# Patient Record
Sex: FEMALE | Race: WHITE
[De-identification: names, ages, dates, MRNs, and addresses within clinical notes are randomized per-mention and may not be internally consistent; named-entity substitution may affect disease eponyms.]

---

## 2020-01-15 ENCOUNTER — HOSPITAL ENCOUNTER (OUTPATIENT)
Dept: HOSPITAL 53 - M WUC | Age: 25
End: 2020-01-15
Attending: PHYSICIAN ASSISTANT
Payer: COMMERCIAL
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
## 2020-01-15 DIAGNOSIS — S60.212A: Primary | ICD-10-CM

## 2020-01-15 NOTE — REP
Left wrist series:  Four views.

 

History:  Contusion.

 

Findings:  Four views of the left wrist demonstrate overall normal

mineralization.  No fracture or subluxation is seen.

 

Impression:

 

Negative radiographs of the left wrist.

 

 

Electronically Signed by

Pierre Begum MD 01/15/2020 07:37 P

## 2020-01-16 NOTE — REP
Left hand series:  Four views:

 

History:  Contusion.

 

Findings:  Four views of the left hand demonstrate overall normal mineralization.

Bones joints and soft tissues are unremarkable.  No fracture or subluxation is

seen.

 

Impression:

 

Negative left hand radiographs.

 

 

Electronically Signed by

Pierre Begum MD 01/16/2020 09:33 A

## 2020-08-05 ENCOUNTER — HOSPITAL ENCOUNTER (OUTPATIENT)
Dept: HOSPITAL 53 - M RAD | Age: 25
End: 2020-08-05
Attending: PHYSICIAN ASSISTANT
Payer: COMMERCIAL

## 2020-08-05 ENCOUNTER — HOSPITAL ENCOUNTER (OUTPATIENT)
Dept: HOSPITAL 53 - M LAB REF | Age: 25
End: 2020-08-05
Attending: PHYSICIAN ASSISTANT
Payer: COMMERCIAL

## 2020-08-05 DIAGNOSIS — R10.9: ICD-10-CM

## 2020-08-05 DIAGNOSIS — Z90.49: ICD-10-CM

## 2020-08-05 DIAGNOSIS — N28.1: ICD-10-CM

## 2020-08-05 DIAGNOSIS — K76.0: Primary | ICD-10-CM

## 2020-08-05 DIAGNOSIS — R10.11: Primary | ICD-10-CM

## 2020-09-05 LAB
BASOPHILS # BLD AUTO: 0.1 10^3/UL (ref 0–0.2)
BASOPHILS NFR BLD AUTO: 0.6 % (ref 0–1)
EOSINOPHIL # BLD AUTO: 0.1 10^3/UL (ref 0–0.5)
EOSINOPHIL NFR BLD AUTO: 1 % (ref 0–3)
HCT VFR BLD AUTO: 42 % (ref 36–47)
HGB BLD-MCNC: 14.1 G/DL (ref 12–15.5)
LYMPHOCYTES # BLD AUTO: 2.6 10^3/UL (ref 1.5–5)
LYMPHOCYTES NFR BLD AUTO: 32.5 % (ref 24–44)
MCH RBC QN AUTO: 30.9 PG (ref 27–33)
MCHC RBC AUTO-ENTMCNC: 33.6 G/DL (ref 32–36.5)
MCV RBC AUTO: 91.9 FL (ref 80–96)
MONOCYTES # BLD AUTO: 0.6 10^3/UL (ref 0–0.8)
MONOCYTES NFR BLD AUTO: 7.3 % (ref 0–5)
NEUTROPHILS # BLD AUTO: 4.7 10^3/UL (ref 1.5–8.5)
NEUTROPHILS NFR BLD AUTO: 58.2 % (ref 36–66)
PLATELET # BLD AUTO: 285 10^3/UL (ref 150–450)
RBC # BLD AUTO: 4.57 10^6/UL (ref 4–5.4)
WBC # BLD AUTO: 8 10^3/UL (ref 4–10)

## 2020-10-02 LAB
ALBUMIN SERPL BCG-MCNC: 4 GM/DL (ref 3.2–5.2)
ALT SERPL W P-5'-P-CCNC: 60 U/L (ref 12–78)
BILIRUB SERPL-MCNC: 0.5 MG/DL (ref 0.2–1)
BUN SERPL-MCNC: 9 MG/DL (ref 7–18)
CALCIUM SERPL-MCNC: 9.2 MG/DL (ref 8.5–10.1)
CHLORIDE SERPL-SCNC: 106 MEQ/L (ref 98–107)
CO2 SERPL-SCNC: 30 MEQ/L (ref 21–32)
CREAT SERPL-MCNC: 0.87 MG/DL (ref 0.55–1.3)
GFR SERPL CREATININE-BSD FRML MDRD: > 60 ML/MIN/{1.73_M2} (ref 60–?)
GLUCOSE SERPL-MCNC: 86 MG/DL (ref 70–100)
LIPASE SERPL-CCNC: 177 U/L (ref 73–393)
POTASSIUM SERPL-SCNC: 4.2 MEQ/L (ref 3.5–5.1)
PROT SERPL-MCNC: 7.4 GM/DL (ref 6.4–8.2)
SODIUM SERPL-SCNC: 138 MEQ/L (ref 136–145)

## 2020-12-14 ENCOUNTER — HOSPITAL ENCOUNTER (OUTPATIENT)
Dept: HOSPITAL 53 - M LABSMTC | Age: 25
End: 2020-12-14
Attending: PEDIATRICS
Payer: SELF-PAY

## 2020-12-14 DIAGNOSIS — Z20.828: Primary | ICD-10-CM

## 2021-04-12 ENCOUNTER — HOSPITAL ENCOUNTER (OUTPATIENT)
Dept: HOSPITAL 53 - M LAB | Age: 26
End: 2021-04-12
Payer: COMMERCIAL

## 2021-04-12 DIAGNOSIS — Z32.00: Primary | ICD-10-CM

## 2021-04-12 LAB
B-HCG SERPL-ACNC: 32 MIU/ML
ESTRADIOL SERPL-MCNC: 125.9 PG/ML
PROGEST SERPL-MCNC: 1.51 NG/ML

## 2021-04-14 ENCOUNTER — HOSPITAL ENCOUNTER (OUTPATIENT)
Dept: HOSPITAL 53 - M LAB | Age: 26
End: 2021-04-14
Attending: PHYSICIAN ASSISTANT
Payer: COMMERCIAL

## 2021-04-14 DIAGNOSIS — E28.9: Primary | ICD-10-CM

## 2021-04-14 LAB
B-HCG SERPL-ACNC: 50 MIU/ML
PROGEST SERPL-MCNC: 19.18 NG/ML

## 2021-04-16 ENCOUNTER — HOSPITAL ENCOUNTER (OUTPATIENT)
Dept: HOSPITAL 53 - M LAB | Age: 26
End: 2021-04-16
Payer: COMMERCIAL

## 2021-04-16 DIAGNOSIS — E28.9: Primary | ICD-10-CM

## 2021-06-11 ENCOUNTER — HOSPITAL ENCOUNTER (OUTPATIENT)
Dept: HOSPITAL 53 - M LAB | Age: 26
End: 2021-06-11
Payer: COMMERCIAL

## 2021-06-11 DIAGNOSIS — N97.9: Primary | ICD-10-CM

## 2021-06-11 LAB
B-HCG SERPL-ACNC: < 1 MIU/ML
ESTRADIOL SERPL-MCNC: 51.9 PG/ML
FSH SERPL-ACNC: 5.5 MIU/ML
LH SERPL-ACNC: 7.6 MIU/ML
PROGEST SERPL-MCNC: 0.59 NG/ML
TSH SERPL DL<=0.005 MIU/L-ACNC: 3.72 UIU/ML (ref 0.36–3.74)

## 2021-06-11 NOTE — REPVR
PROCEDURE INFORMATION: 

Exam: US Pelvis Limited, Transabdominal and US Pelvis, Transvaginal 

Exam date and time: 6/11/2021 7:35 AM 

Age: 26 years old 

Clinical indication: Screening exam; Follicle study; Additional info: Female 

infertility, unspecified 



TECHNIQUE: 

Imaging protocol: Real-time transabdominal and transvaginal pelvic ultrasound 

(limited) with image documentation. Transvaginal imaging was used for better 

evaluation of the endometrium, adnexa, and/or cervix. 



COMPARISON: 

US PELVIC NON-OB COMPLETE 7/1/2015 11:39 AM 



FINDINGS: 

Uterus/cervix: The uterus is anteverted and measures 8.9 x 3.6 x 5.7 cm. The 

endometrial stripe measures 9 mm in thickness. There is a nabothian cyst in the 

cervix. 

Right adnexa: The right ovary measures 3.9 x 3.0 x 3.1 cm. Thirteen small 

follicles were identified in the right ovary ranging from 4-8 mm in diameter. 

The right ovary was not visualized using the transvaginal probe, due to a high 

position and could only be visualized using the transabdominal approach. 

Left adnexa: The left ovary measures 3.5 x 2.3 x 3.2 cm. There is a follicle 

measuring 12 x 12 mm. Fourteen additional small follicles are seen ranging from 

3-7 mm in diameter. The left ovary was not visualized using the transvaginal 

probe, due to a high position and could only be visualized using the 

transabdominal approach. 

Intraperitoneal space: No significant fluid is seen in the cul-de-sac. 



IMPRESSION: 

1. Normal appearance of the uterus. 

2. 12 x 12 mm follicle in the left ovary and multiple additional small 

follicles in both ovaries. 



Electronically signed by: Sarita Gonzalez On 06/11/2021  08:17:30 AM

## 2021-06-21 ENCOUNTER — HOSPITAL ENCOUNTER (OUTPATIENT)
Dept: HOSPITAL 53 - M RAD | Age: 26
End: 2021-06-21
Payer: COMMERCIAL

## 2021-06-21 DIAGNOSIS — N97.9: Primary | ICD-10-CM

## 2021-06-21 LAB
ESTRADIOL SERPL-MCNC: 51.8 PG/ML
LH SERPL-ACNC: 7 MIU/ML
PROGEST SERPL-MCNC: 0.54 NG/ML

## 2021-06-21 NOTE — REP
INDICATION:

INFERTILITY- LABS FIRST.



COMPARISON:

06/11/2021.



TECHNIQUE:

Transvaginal and transabdominal pelvic ultrasound performed.  The ovaries could only

be visualized on transabdominal scanning as they are located superiorly in the pelvis.



FINDINGS:

Uterus measures 8.5 x 3.8 x 4.8 cm.  Endometrial echo complex measures 5 mm in AP

diameter.



Right ovary measures 4.2 x 2.5 x 2.9 cm.  There are 5 dominant follicles 10 mm in

diameter or greater.  These measure 10 x 3 mm, 10 x 5 mm, 13 x 9 mm, 12 x 8 mm and 15

x 4 mm.  Multiple other subcentimeter follicles are seen.



Left ovary measures 3.2 x 2.5 x 2.4 cm.  There are 2 dominant follicles which measure

18 x 15 and 10 x 6 mm.  Multiple other subcentimeter follicles are seen.



IMPRESSION:

Ovarian follicles as discussed above.





<Electronically signed by Kobe Figueroa > 06/21/21 [id number]

## 2021-06-30 ENCOUNTER — HOSPITAL ENCOUNTER (OUTPATIENT)
Dept: HOSPITAL 53 - M LAB | Age: 26
End: 2021-06-30
Payer: COMMERCIAL

## 2021-06-30 DIAGNOSIS — Z32.00: Primary | ICD-10-CM

## 2021-06-30 LAB
B-HCG SERPL-ACNC: 9 MIU/ML
ESTRADIOL SERPL-MCNC: 115.9 PG/ML
PROGEST SERPL-MCNC: 22.3 NG/ML

## 2021-07-14 ENCOUNTER — HOSPITAL ENCOUNTER (OUTPATIENT)
Dept: HOSPITAL 53 - M LAB | Age: 26
End: 2021-07-14
Payer: COMMERCIAL

## 2021-07-14 DIAGNOSIS — E28.9: Primary | ICD-10-CM

## 2021-07-14 LAB
B-HCG SERPL-ACNC: < 1 MIU/ML
ESTRADIOL SERPL-MCNC: 47.5 PG/ML
FSH SERPL-ACNC: 5.4 MIU/ML
LH SERPL-ACNC: 4.6 MIU/ML
PROGEST SERPL-MCNC: 0.6 NG/ML
TSH SERPL DL<=0.005 MIU/L-ACNC: 2.28 UIU/ML (ref 0.36–3.74)

## 2021-07-14 NOTE — REP
INDICATION:

OVARIAN DYSFUNCTION, UNSPECIFIED PT NEEDS LABS AFTER US.



COMPARISON:

Comparison study June 21, 2021..



TECHNIQUE:

Transvaginal pelvic sonography.  Ovarian follicle study.



FINDINGS:

Uterine dimensions today are 8.1 x 4.1 x 5.0 cm.  Endometrial stripe is 0.2 cm thick.

No focal uterine abnormality is observed.  No free fluid is seen in the cul-de-sac.



The overall dimensions of the right ovary today are 2.9 x 2.5 x 2.4 cm.  There are no

follicles greater than a cm in the right ovary.  There are 10 follicles ranging in

size from 0.3-0.7 cm in the right ovary.



Left ovarian dimensions are 3.1 x 2.2 x 2.9 cm.  There are no follicles greater than a

cm in diameter in the left ovary today.  There are 15 follicles ranging in size from

0.2-0.7 cm in the left ovary.



IMPRESSION:

Normal pelvic sonography.  Ovarian follicles as described above.





<Electronically signed by Nicko Begum > 07/14/21 [id number]

## 2021-07-23 ENCOUNTER — HOSPITAL ENCOUNTER (OUTPATIENT)
Dept: HOSPITAL 53 - M RAD | Age: 26
End: 2021-07-23
Payer: COMMERCIAL

## 2021-07-23 DIAGNOSIS — N97.9: Primary | ICD-10-CM

## 2021-07-23 LAB
ESTRADIOL SERPL-MCNC: 87.6 PG/ML
LH SERPL-ACNC: 6.2 MIU/ML
PROGEST SERPL-MCNC: 0.66 NG/ML

## 2021-07-23 NOTE — REP
INDICATION:

INFERTILITY- LABS AFTER. Ovarian dysfunction.



COMPARISON:

Comparison study July 14, 2021..



TECHNIQUE:

Transvaginal and transabdominal pelvic sonography.  Ovarian follicle protocol.



FINDINGS:

Transvaginal scanning demonstrates normal size uterus with the measured dimensions are

9.0 x 4.1 x 5.4 cm.  The endometrial stripe is 0.8 cm thick and centrally placed.

Exam quality was inhibited some degree by patient body habitus.  The ovaries are best

visualized on transabdominal scanning.



Overall dimensions of the right ovary are 4.2 x 2.7 x 3.3 cm.  There are no follicles

greater than a cm in diameter in the right ovary.  There are 14 follicles in the right

ovary ranging in size from 0.2-0.8 cm.



The left ovary is dimensions are 3.2 x 2.8 x 3.1 cm.  There are 3 follicles greater

than a cm in diameter measured as follows: 2.0 x 1.6, 1.5 x 1.2, 1.1 x 0.5 cm.  In

addition, the left ovary contains 10 follicles ranging in size from 0.1-0.6 cm.



There is no evidence of free fluid in the cul-de-sac.  The study is otherwise

unremarkable.



IMPRESSION:

Ovarian follicles measured as above.  No morphologic abnormality.





<Electronically signed by Nicko Begum > 07/23/21 [id number]

## 2022-02-08 ENCOUNTER — HOSPITAL ENCOUNTER (OUTPATIENT)
Dept: HOSPITAL 53 - M RAD | Age: 27
End: 2022-02-08
Attending: PHYSICIAN ASSISTANT
Payer: COMMERCIAL

## 2022-02-08 ENCOUNTER — HOSPITAL ENCOUNTER (OUTPATIENT)
Dept: HOSPITAL 53 - M SFHCCAPE | Age: 27
End: 2022-02-08
Attending: PHYSICIAN ASSISTANT
Payer: COMMERCIAL

## 2022-02-08 DIAGNOSIS — I10: ICD-10-CM

## 2022-02-08 DIAGNOSIS — N91.2: ICD-10-CM

## 2022-02-08 DIAGNOSIS — R51.9: ICD-10-CM

## 2022-02-08 DIAGNOSIS — R10.9: Primary | ICD-10-CM

## 2022-02-08 LAB
ALBUMIN SERPL BCG-MCNC: 3.9 GM/DL (ref 3.2–5.2)
ALT SERPL W P-5'-P-CCNC: 48 U/L (ref 12–78)
AMYLASE SERPL-CCNC: 50 U/L (ref 25–115)
B-HCG SERPL QL: NEGATIVE
BASOPHILS # BLD AUTO: 0.1 10^3/UL (ref 0–0.2)
BASOPHILS NFR BLD AUTO: 0.4 % (ref 0–1)
BILIRUB SERPL-MCNC: 0.3 MG/DL (ref 0.2–1)
BUN SERPL-MCNC: 12 MG/DL (ref 7–18)
CALCIUM SERPL-MCNC: 9.4 MG/DL (ref 8.5–10.1)
CHLORIDE SERPL-SCNC: 105 MEQ/L (ref 98–107)
CO2 SERPL-SCNC: 28 MEQ/L (ref 21–32)
CREAT SERPL-MCNC: 0.82 MG/DL (ref 0.55–1.3)
EOSINOPHIL # BLD AUTO: 0.1 10^3/UL (ref 0–0.5)
EOSINOPHIL NFR BLD AUTO: 0.9 % (ref 0–3)
GFR SERPL CREATININE-BSD FRML MDRD: > 60 ML/MIN/{1.73_M2} (ref 60–?)
GLUCOSE SERPL-MCNC: 86 MG/DL (ref 70–100)
HCT VFR BLD AUTO: 40.2 % (ref 36–47)
HGB BLD-MCNC: 13.8 G/DL (ref 12–15.5)
LIPASE SERPL-CCNC: 180 U/L (ref 73–393)
LYMPHOCYTES # BLD AUTO: 3 10^3/UL (ref 1.5–5)
LYMPHOCYTES NFR BLD AUTO: 24.5 % (ref 24–44)
MCH RBC QN AUTO: 30.7 PG (ref 27–33)
MCHC RBC AUTO-ENTMCNC: 34.3 G/DL (ref 32–36.5)
MCV RBC AUTO: 89.3 FL (ref 80–96)
MONOCYTES # BLD AUTO: 0.8 10^3/UL (ref 0–0.8)
MONOCYTES NFR BLD AUTO: 6.7 % (ref 2–8)
NEUTROPHILS # BLD AUTO: 8.1 10^3/UL (ref 1.5–8.5)
NEUTROPHILS NFR BLD AUTO: 67 % (ref 36–66)
PLATELET # BLD AUTO: 303 10^3/UL (ref 150–450)
POTASSIUM SERPL-SCNC: 3.9 MEQ/L (ref 3.5–5.1)
PROT SERPL-MCNC: 7.1 GM/DL (ref 6.4–8.2)
RBC # BLD AUTO: 4.5 10^6/UL (ref 4–5.4)
SODIUM SERPL-SCNC: 138 MEQ/L (ref 136–145)
TSH SERPL DL<=0.005 MIU/L-ACNC: 3.74 UIU/ML (ref 0.36–3.74)
WBC # BLD AUTO: 12.1 10^3/UL (ref 4–10)

## 2022-02-09 ENCOUNTER — HOSPITAL ENCOUNTER (OUTPATIENT)
Dept: HOSPITAL 53 - M RAD | Age: 27
End: 2022-02-09
Attending: PHYSICIAN ASSISTANT
Payer: COMMERCIAL

## 2022-02-09 DIAGNOSIS — R10.31: Primary | ICD-10-CM

## 2022-02-16 ENCOUNTER — HOSPITAL ENCOUNTER (OUTPATIENT)
Dept: HOSPITAL 53 - M RAD | Age: 27
End: 2022-02-16
Payer: COMMERCIAL

## 2022-02-16 ENCOUNTER — HOSPITAL ENCOUNTER (OUTPATIENT)
Dept: HOSPITAL 53 - M LAB | Age: 27
End: 2022-02-16
Attending: PHYSICIAN ASSISTANT
Payer: COMMERCIAL

## 2022-02-16 DIAGNOSIS — E28.9: Primary | ICD-10-CM

## 2022-02-16 DIAGNOSIS — R10.31: Primary | ICD-10-CM

## 2022-02-16 LAB
ALBUMIN SERPL BCG-MCNC: 3.5 GM/DL (ref 3.2–5.2)
ALT SERPL W P-5'-P-CCNC: 43 U/L (ref 12–78)
B-HCG SERPL-ACNC: < 1 MIU/ML
BASOPHILS # BLD AUTO: 0.1 10^3/UL (ref 0–0.2)
BASOPHILS NFR BLD AUTO: 0.6 % (ref 0–1)
BILIRUB SERPL-MCNC: 0.2 MG/DL (ref 0.2–1)
BUN SERPL-MCNC: 11 MG/DL (ref 7–18)
CALCIUM SERPL-MCNC: 9 MG/DL (ref 8.5–10.1)
CHLORIDE SERPL-SCNC: 110 MEQ/L (ref 98–107)
CO2 SERPL-SCNC: 26 MEQ/L (ref 21–32)
CREAT SERPL-MCNC: 0.87 MG/DL (ref 0.55–1.3)
EOSINOPHIL # BLD AUTO: 0.1 10^3/UL (ref 0–0.5)
EOSINOPHIL NFR BLD AUTO: 1 % (ref 0–3)
ESTRADIOL SERPL-MCNC: 104.1 PG/ML
FSH SERPL-ACNC: 2.1 MIU/ML
GFR SERPL CREATININE-BSD FRML MDRD: > 60 ML/MIN/{1.73_M2} (ref 60–?)
GLUCOSE SERPL-MCNC: 102 MG/DL (ref 70–100)
HCT VFR BLD AUTO: 40 % (ref 36–47)
HGB BLD-MCNC: 13.6 G/DL (ref 12–15.5)
LH SERPL-ACNC: 3.1 MIU/ML
LYMPHOCYTES # BLD AUTO: 2.1 10^3/UL (ref 1.5–5)
LYMPHOCYTES NFR BLD AUTO: 22.2 % (ref 24–44)
MCH RBC QN AUTO: 30.8 PG (ref 27–33)
MCHC RBC AUTO-ENTMCNC: 34 G/DL (ref 32–36.5)
MCV RBC AUTO: 90.7 FL (ref 80–96)
MONOCYTES # BLD AUTO: 0.8 10^3/UL (ref 0–0.8)
MONOCYTES NFR BLD AUTO: 7.8 % (ref 2–8)
NEUTROPHILS # BLD AUTO: 6.5 10^3/UL (ref 1.5–8.5)
NEUTROPHILS NFR BLD AUTO: 67.7 % (ref 36–66)
PLATELET # BLD AUTO: 274 10^3/UL (ref 150–450)
POTASSIUM SERPL-SCNC: 3.9 MEQ/L (ref 3.5–5.1)
PROGEST SERPL-MCNC: 8.91 NG/ML
PROT SERPL-MCNC: 7.5 GM/DL (ref 6.4–8.2)
RBC # BLD AUTO: 4.41 10^6/UL (ref 4–5.4)
SODIUM SERPL-SCNC: 142 MEQ/L (ref 136–145)
TSH SERPL DL<=0.005 MIU/L-ACNC: 3.28 UIU/ML (ref 0.36–3.74)
WBC # BLD AUTO: 9.7 10^3/UL (ref 4–10)

## 2022-07-29 ENCOUNTER — HOSPITAL ENCOUNTER (OUTPATIENT)
Dept: HOSPITAL 53 - M RAD | Age: 27
End: 2022-07-29
Payer: COMMERCIAL

## 2022-07-29 DIAGNOSIS — Z31.83: Primary | ICD-10-CM

## 2022-07-29 LAB
B-HCG SERPL-ACNC: < 1 MIU/ML
ESTRADIOL SERPL-MCNC: 378.8 PG/ML
FSH SERPL-ACNC: 12.6 MIU/ML
LH SERPL-ACNC: 62.2 MIU/ML
PROGEST SERPL-MCNC: 1.13 NG/ML
TSH SERPL DL<=0.005 MIU/L-ACNC: 2.36 UIU/ML (ref 0.36–3.74)

## 2022-11-14 ENCOUNTER — HOSPITAL ENCOUNTER (OUTPATIENT)
Dept: HOSPITAL 53 - M LAB | Age: 27
End: 2022-11-14
Payer: COMMERCIAL

## 2022-11-14 DIAGNOSIS — Z31.49: Primary | ICD-10-CM

## 2022-11-14 LAB
ESTRADIOL SERPL-MCNC: 257.5 PG/ML
PROGEST SERPL-MCNC: 21.17 NG/ML

## 2022-11-21 ENCOUNTER — HOSPITAL ENCOUNTER (OUTPATIENT)
Dept: HOSPITAL 53 - M LAB | Age: 27
End: 2022-11-21
Payer: COMMERCIAL

## 2022-11-21 DIAGNOSIS — Z32.00: Primary | ICD-10-CM

## 2022-11-21 LAB
B-HCG SERPL-ACNC: 2.6 MIU/ML (ref ?–4.2)
PROGEST SERPL-MCNC: 8.6 NG/ML

## 2023-03-18 ENCOUNTER — HOSPITAL ENCOUNTER (EMERGENCY)
Dept: HOSPITAL 53 - M ED | Age: 28
LOS: 1 days | Discharge: HOME | End: 2023-03-19
Payer: COMMERCIAL

## 2023-03-18 VITALS — BODY MASS INDEX: 45.03 KG/M2 | HEIGHT: 62 IN | WEIGHT: 244.71 LBS

## 2023-03-18 VITALS — DIASTOLIC BLOOD PRESSURE: 90 MMHG | SYSTOLIC BLOOD PRESSURE: 160 MMHG
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
## 2023-03-18 DIAGNOSIS — S43.402A: Primary | ICD-10-CM
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
## 2023-05-30 ENCOUNTER — HOSPITAL ENCOUNTER (OUTPATIENT)
Dept: HOSPITAL 53 - M SFHCWAGY | Age: 28
End: 2023-05-30
Attending: NURSE PRACTITIONER
Payer: COMMERCIAL

## 2023-05-30 DIAGNOSIS — R87.618: ICD-10-CM

## 2023-05-30 DIAGNOSIS — Z12.4: Primary | ICD-10-CM

## 2023-06-26 ENCOUNTER — HOSPITAL ENCOUNTER (OUTPATIENT)
Dept: HOSPITAL 53 - M PLALAB | Age: 28
End: 2023-06-26
Attending: OBSTETRICS & GYNECOLOGY
Payer: COMMERCIAL

## 2023-06-26 DIAGNOSIS — N96: Primary | ICD-10-CM

## 2024-04-05 ENCOUNTER — HOSPITAL ENCOUNTER (OUTPATIENT)
Dept: HOSPITAL 53 - M SFHCPLAZ | Age: 29
End: 2024-04-05
Attending: PHYSICIAN ASSISTANT
Payer: COMMERCIAL

## 2024-04-05 DIAGNOSIS — R39.9: Primary | ICD-10-CM
